# Patient Record
Sex: MALE | Race: WHITE | NOT HISPANIC OR LATINO | URBAN - METROPOLITAN AREA
[De-identification: names, ages, dates, MRNs, and addresses within clinical notes are randomized per-mention and may not be internally consistent; named-entity substitution may affect disease eponyms.]

---

## 2017-01-24 ENCOUNTER — COMMUNICATION - HEALTHEAST (OUTPATIENT)
Dept: CARDIOLOGY | Facility: CLINIC | Age: 82
End: 2017-01-24

## 2017-03-09 ENCOUNTER — AMBULATORY - HEALTHEAST (OUTPATIENT)
Dept: CARDIOLOGY | Facility: CLINIC | Age: 82
End: 2017-03-09

## 2017-03-09 ENCOUNTER — OFFICE VISIT - HEALTHEAST (OUTPATIENT)
Dept: CARDIOLOGY | Facility: CLINIC | Age: 82
End: 2017-03-09

## 2017-03-09 DIAGNOSIS — I48.0 PAROXYSMAL ATRIAL FIBRILLATION (H): ICD-10-CM

## 2017-03-09 DIAGNOSIS — Z95.810 ICD (IMPLANTABLE CARDIOVERTER-DEFIBRILLATOR), BIVENTRICULAR, IN SITU: ICD-10-CM

## 2017-03-09 DIAGNOSIS — I42.9 IDIOPATHIC CARDIOMYOPATHY (H): ICD-10-CM

## 2017-03-09 ASSESSMENT — MIFFLIN-ST. JEOR: SCORE: 1592.19

## 2017-06-12 ENCOUNTER — AMBULATORY - HEALTHEAST (OUTPATIENT)
Dept: CARDIOLOGY | Facility: CLINIC | Age: 82
End: 2017-06-12

## 2017-06-12 DIAGNOSIS — Z95.810 ICD (IMPLANTABLE CARDIOVERTER-DEFIBRILLATOR), BIVENTRICULAR, IN SITU: ICD-10-CM

## 2017-06-12 LAB — HCC DEVICE COMMENTS: NORMAL

## 2017-08-07 ENCOUNTER — COMMUNICATION - HEALTHEAST (OUTPATIENT)
Dept: CARDIOLOGY | Facility: CLINIC | Age: 82
End: 2017-08-07

## 2017-09-19 ENCOUNTER — AMBULATORY - HEALTHEAST (OUTPATIENT)
Dept: CARDIOLOGY | Facility: CLINIC | Age: 82
End: 2017-09-19

## 2017-09-19 ENCOUNTER — OFFICE VISIT - HEALTHEAST (OUTPATIENT)
Dept: CARDIOLOGY | Facility: CLINIC | Age: 82
End: 2017-09-19

## 2017-09-19 DIAGNOSIS — I25.10 CORONARY ARTERY DISEASE DUE TO CALCIFIED CORONARY LESION: ICD-10-CM

## 2017-09-19 DIAGNOSIS — I25.84 CORONARY ARTERY DISEASE DUE TO CALCIFIED CORONARY LESION: ICD-10-CM

## 2017-09-19 DIAGNOSIS — Z95.810 ICD (IMPLANTABLE CARDIOVERTER-DEFIBRILLATOR), BIVENTRICULAR, IN SITU: ICD-10-CM

## 2017-09-19 DIAGNOSIS — I42.8 OTHER PRIMARY CARDIOMYOPATHIES: ICD-10-CM

## 2017-09-19 LAB — HCC DEVICE COMMENTS: NORMAL

## 2017-09-19 RX ORDER — DORZOLAMIDE HCL 20 MG/ML
1 SOLUTION/ DROPS OPHTHALMIC 3 TIMES DAILY
Status: SHIPPED | COMMUNITY
Start: 2017-09-19

## 2017-09-19 ASSESSMENT — MIFFLIN-ST. JEOR: SCORE: 1516.9

## 2017-12-27 ENCOUNTER — AMBULATORY - HEALTHEAST (OUTPATIENT)
Dept: CARDIOLOGY | Facility: CLINIC | Age: 82
End: 2017-12-27

## 2017-12-27 DIAGNOSIS — Z95.810 ICD (IMPLANTABLE CARDIOVERTER-DEFIBRILLATOR), BIVENTRICULAR, IN SITU: ICD-10-CM

## 2017-12-27 LAB — HCC DEVICE COMMENTS: NORMAL

## 2018-02-27 ENCOUNTER — AMBULATORY - HEALTHEAST (OUTPATIENT)
Dept: CARDIOLOGY | Facility: CLINIC | Age: 83
End: 2018-02-27

## 2018-02-27 ENCOUNTER — RECORDS - HEALTHEAST (OUTPATIENT)
Dept: ADMINISTRATIVE | Facility: OTHER | Age: 83
End: 2018-02-27

## 2018-03-02 ENCOUNTER — OFFICE VISIT - HEALTHEAST (OUTPATIENT)
Dept: CARDIOLOGY | Facility: CLINIC | Age: 83
End: 2018-03-02

## 2018-03-02 ENCOUNTER — AMBULATORY - HEALTHEAST (OUTPATIENT)
Dept: CARDIOLOGY | Facility: CLINIC | Age: 83
End: 2018-03-02

## 2018-03-02 DIAGNOSIS — I48.21 PERMANENT ATRIAL FIBRILLATION (H): ICD-10-CM

## 2018-03-02 DIAGNOSIS — Z95.810 ICD (IMPLANTABLE CARDIOVERTER-DEFIBRILLATOR), BIVENTRICULAR, IN SITU: ICD-10-CM

## 2018-03-02 DIAGNOSIS — I42.0 DILATED CARDIOMYOPATHY (H): ICD-10-CM

## 2018-03-02 LAB — HCC DEVICE COMMENTS: NORMAL

## 2018-03-02 RX ORDER — PILOCARPINE HYDROCHLORIDE 20 MG/ML
1 SOLUTION/ DROPS OPHTHALMIC 2 TIMES DAILY
Status: SHIPPED | COMMUNITY
Start: 2018-03-02

## 2018-03-02 ASSESSMENT — MIFFLIN-ST. JEOR: SCORE: 1504.43

## 2018-05-28 ENCOUNTER — COMMUNICATION - HEALTHEAST (OUTPATIENT)
Dept: CARDIOLOGY | Facility: CLINIC | Age: 83
End: 2018-05-28

## 2018-05-28 DIAGNOSIS — I42.9 IDIOPATHIC CARDIOMYOPATHY (H): ICD-10-CM

## 2018-05-28 DIAGNOSIS — I48.0 PAROXYSMAL ATRIAL FIBRILLATION (H): ICD-10-CM

## 2018-05-31 ENCOUNTER — AMBULATORY - HEALTHEAST (OUTPATIENT)
Dept: CARDIOLOGY | Facility: CLINIC | Age: 83
End: 2018-05-31

## 2018-05-31 DIAGNOSIS — Z95.810 ICD (IMPLANTABLE CARDIOVERTER-DEFIBRILLATOR), BIVENTRICULAR, IN SITU: ICD-10-CM

## 2018-05-31 LAB
HCC DEVICE COMMENTS: NORMAL
HCC DEVICE IMPLANTING PROVIDER: NORMAL
HCC DEVICE MANUFACTURE: NORMAL
HCC DEVICE MODEL: NORMAL
HCC DEVICE SERIAL NUMBER: NORMAL
HCC DEVICE TYPE: NORMAL

## 2018-07-12 ENCOUNTER — COMMUNICATION - HEALTHEAST (OUTPATIENT)
Dept: ADMINISTRATIVE | Facility: CLINIC | Age: 83
End: 2018-07-12

## 2018-09-04 ENCOUNTER — RECORDS - HEALTHEAST (OUTPATIENT)
Dept: ADMINISTRATIVE | Facility: OTHER | Age: 83
End: 2018-09-04

## 2018-09-04 ENCOUNTER — AMBULATORY - HEALTHEAST (OUTPATIENT)
Dept: CARDIOLOGY | Facility: CLINIC | Age: 83
End: 2018-09-04

## 2018-09-06 ENCOUNTER — AMBULATORY - HEALTHEAST (OUTPATIENT)
Dept: CARDIOLOGY | Facility: CLINIC | Age: 83
End: 2018-09-06

## 2018-09-06 DIAGNOSIS — Z95.810 ICD (IMPLANTABLE CARDIOVERTER-DEFIBRILLATOR), BIVENTRICULAR, IN SITU: ICD-10-CM

## 2018-09-07 ENCOUNTER — OFFICE VISIT - HEALTHEAST (OUTPATIENT)
Dept: CARDIOLOGY | Facility: CLINIC | Age: 83
End: 2018-09-07

## 2018-09-07 DIAGNOSIS — I50.22 CHRONIC SYSTOLIC CONGESTIVE HEART FAILURE (H): ICD-10-CM

## 2018-09-07 RX ORDER — WARFARIN SODIUM 4 MG/1
TABLET ORAL
Refills: 3 | Status: SHIPPED | COMMUNITY
Start: 2018-07-11

## 2018-09-07 RX ORDER — TRAVOPROST OPHTHALMIC SOLUTION 0.04 MG/ML
1 SOLUTION OPHTHALMIC AT BEDTIME
Status: SHIPPED | COMMUNITY
Start: 2018-09-07

## 2018-09-07 ASSESSMENT — MIFFLIN-ST. JEOR: SCORE: 1490.82

## 2018-11-12 ENCOUNTER — COMMUNICATION - HEALTHEAST (OUTPATIENT)
Dept: CARDIOLOGY | Facility: CLINIC | Age: 83
End: 2018-11-12

## 2018-11-12 RX ORDER — METHAZOLAMIDE 50 MG/1
50 TABLET ORAL 2 TIMES DAILY
Status: SHIPPED | COMMUNITY
Start: 2018-11-12

## 2018-12-12 ENCOUNTER — AMBULATORY - HEALTHEAST (OUTPATIENT)
Dept: CARDIOLOGY | Facility: CLINIC | Age: 83
End: 2018-12-12

## 2018-12-12 DIAGNOSIS — Z95.810 ICD (IMPLANTABLE CARDIOVERTER-DEFIBRILLATOR), BIVENTRICULAR, IN SITU: ICD-10-CM

## 2019-01-04 ENCOUNTER — COMMUNICATION - HEALTHEAST (OUTPATIENT)
Dept: ADMINISTRATIVE | Facility: CLINIC | Age: 84
End: 2019-01-04

## 2019-02-25 ENCOUNTER — AMBULATORY - HEALTHEAST (OUTPATIENT)
Dept: CARDIOLOGY | Facility: CLINIC | Age: 84
End: 2019-02-25

## 2019-02-25 ENCOUNTER — RECORDS - HEALTHEAST (OUTPATIENT)
Dept: ADMINISTRATIVE | Facility: OTHER | Age: 84
End: 2019-02-25

## 2019-03-04 ENCOUNTER — DOCTOR'S OFFICE (OUTPATIENT)
Dept: URBAN - METROPOLITAN AREA CLINIC 125 | Facility: CLINIC | Age: 84
Setting detail: OPHTHALMOLOGY
End: 2019-03-04
Payer: COMMERCIAL

## 2019-03-04 DIAGNOSIS — H02.885: ICD-10-CM

## 2019-03-04 DIAGNOSIS — H00.15: ICD-10-CM

## 2019-03-04 DIAGNOSIS — H27.8: ICD-10-CM

## 2019-03-04 DIAGNOSIS — H35.3222: ICD-10-CM

## 2019-03-04 DIAGNOSIS — H35.3111: ICD-10-CM

## 2019-03-04 DIAGNOSIS — H43.813: ICD-10-CM

## 2019-03-04 DIAGNOSIS — H16.222: ICD-10-CM

## 2019-03-04 DIAGNOSIS — H01.004: ICD-10-CM

## 2019-03-04 DIAGNOSIS — H01.001: ICD-10-CM

## 2019-03-04 DIAGNOSIS — H16.221: ICD-10-CM

## 2019-03-04 DIAGNOSIS — H27.9: ICD-10-CM

## 2019-03-04 PROCEDURE — 92004 COMPRE OPH EXAM NEW PT 1/>: CPT | Performed by: OPHTHALMOLOGY

## 2019-03-04 PROCEDURE — 92134 CPTRZ OPH DX IMG PST SGM RTA: CPT | Performed by: OPHTHALMOLOGY

## 2019-03-04 ASSESSMENT — REFRACTION_MANIFEST
OS_VA1: 20/
OD_VA1: 20/
OD_VA3: 20/
OS_VA3: 20/
OD_VA2: 20/
OS_VA2: 20/
OU_VA: 20/
OS_VA2: 20/
OS_VA3: 20/
OD_VA3: 20/
OD_VA2: 20/
OU_VA: 20/
OD_VA1: 20/
OS_VA1: 20/

## 2019-03-04 ASSESSMENT — REFRACTION_AUTOREFRACTION
OS_SPHERE: UNABLE
OD_SPHERE: -0.25
OD_AXIS: 60
OD_CYLINDER: -1.25

## 2019-03-04 ASSESSMENT — LID EXAM ASSESSMENTS
OD_BLEPHARITIS: 1+
OD_MEIBOMITIS: 1+
OS_MEIBOMITIS: 1+
OS_BLEPHARITIS: 1+

## 2019-03-04 ASSESSMENT — REFRACTION_CURRENTRX
OS_OVR_VA: 20/
OD_OVR_VA: 20/
OS_OVR_VA: 20/
OS_OVR_VA: 20/
OD_OVR_VA: 20/
OD_OVR_VA: 20/

## 2019-03-04 ASSESSMENT — SUPERFICIAL PUNCTATE KERATITIS (SPK)
OD_SPK: 1+
OS_SPK: 2+

## 2019-03-04 ASSESSMENT — VISUAL ACUITY
OS_BCVA: 20/40
OD_BCVA: 20/400

## 2019-03-04 ASSESSMENT — CONFRONTATIONAL VISUAL FIELD TEST (CVF)
OD_FINDINGS: FULL
OS_FINDINGS: FULL

## 2019-03-04 ASSESSMENT — SPHEQUIV_DERIVED: OD_SPHEQUIV: -0.875

## 2019-03-20 ENCOUNTER — DOCTOR'S OFFICE (OUTPATIENT)
Dept: URBAN - METROPOLITAN AREA CLINIC 125 | Facility: CLINIC | Age: 84
Setting detail: OPHTHALMOLOGY
End: 2019-03-20
Payer: COMMERCIAL

## 2019-03-20 ENCOUNTER — RX ONLY (RX ONLY)
Age: 84
End: 2019-03-20

## 2019-03-20 DIAGNOSIS — H35.3114: ICD-10-CM

## 2019-03-20 DIAGNOSIS — H43.813: ICD-10-CM

## 2019-03-20 DIAGNOSIS — H35.3222: ICD-10-CM

## 2019-03-20 PROBLEM — H00.15 CHALAZION; LEFT LOWER LID: Status: ACTIVE | Noted: 2019-03-04

## 2019-03-20 PROBLEM — H01.001 BLEPHARITIS; RIGHT UPPER LID, LEFT UPPER LID: Status: ACTIVE | Noted: 2019-03-04

## 2019-03-20 PROBLEM — H27.8 PSEUDOPHAKIA: Status: ACTIVE | Noted: 2019-03-04

## 2019-03-20 PROBLEM — H01.004 BLEPHARITIS; RIGHT UPPER LID, LEFT UPPER LID: Status: ACTIVE | Noted: 2019-03-04

## 2019-03-20 PROBLEM — H02.885 MEIBOMIAN GLAND DYSFUNCTION LEFT LOWER EYELID: Status: ACTIVE | Noted: 2019-03-04

## 2019-03-20 PROBLEM — H16.222 KERATOCONJUNCTIVITIS SICCA; RIGHT EYE, LEFT EYE: Status: ACTIVE | Noted: 2019-03-04

## 2019-03-20 PROBLEM — H27.9 PSEUDOPHAKIA: Status: ACTIVE | Noted: 2019-03-04

## 2019-03-20 PROBLEM — H16.221 KERATOCONJUNCTIVITIS SICCA; RIGHT EYE, LEFT EYE: Status: ACTIVE | Noted: 2019-03-04

## 2019-03-20 PROCEDURE — 92014 COMPRE OPH EXAM EST PT 1/>: CPT | Performed by: OPHTHALMOLOGY

## 2019-03-20 ASSESSMENT — SPHEQUIV_DERIVED: OD_SPHEQUIV: -0.875

## 2019-03-20 ASSESSMENT — LID EXAM ASSESSMENTS
OS_BLEPHARITIS: 1+
OS_MEIBOMITIS: 1+
OD_MEIBOMITIS: 1+
OD_BLEPHARITIS: 1+

## 2019-03-20 ASSESSMENT — REFRACTION_MANIFEST
OS_VA1: 20/
OD_VA2: 20/
OS_VA2: 20/
OS_VA1: 20/
OS_VA3: 20/
OD_VA2: 20/
OS_VA2: 20/
OD_VA3: 20/
OU_VA: 20/
OU_VA: 20/
OD_VA1: 20/
OD_VA3: 20/
OD_VA1: 20/
OS_VA3: 20/

## 2019-03-20 ASSESSMENT — SUPERFICIAL PUNCTATE KERATITIS (SPK)
OD_SPK: 1+
OS_SPK: 2+

## 2019-03-20 ASSESSMENT — REFRACTION_CURRENTRX
OS_OVR_VA: 20/
OD_OVR_VA: 20/
OS_OVR_VA: 20/
OD_OVR_VA: 20/
OS_OVR_VA: 20/
OD_OVR_VA: 20/

## 2019-03-20 ASSESSMENT — REFRACTION_AUTOREFRACTION
OS_SPHERE: UNABLE
OD_CYLINDER: -1.25
OD_SPHERE: -0.25
OD_AXIS: 60

## 2019-03-20 ASSESSMENT — VISUAL ACUITY
OD_BCVA: 20/400
OS_BCVA: 20/40

## 2019-03-20 ASSESSMENT — CONFRONTATIONAL VISUAL FIELD TEST (CVF)
OS_FINDINGS: FULL
OD_FINDINGS: FULL

## 2019-04-01 ENCOUNTER — COMMUNICATION - HEALTHEAST (OUTPATIENT)
Dept: CARDIOLOGY | Facility: CLINIC | Age: 84
End: 2019-04-01

## 2019-04-01 DIAGNOSIS — I42.9 IDIOPATHIC CARDIOMYOPATHY (H): ICD-10-CM

## 2019-04-01 DIAGNOSIS — I48.0 PAROXYSMAL ATRIAL FIBRILLATION (H): ICD-10-CM

## 2019-06-03 ENCOUNTER — AMBULATORY - HEALTHEAST (OUTPATIENT)
Dept: CARDIOLOGY | Facility: CLINIC | Age: 84
End: 2019-06-03

## 2019-06-03 DIAGNOSIS — Z95.810 ICD (IMPLANTABLE CARDIOVERTER-DEFIBRILLATOR), BIVENTRICULAR, IN SITU: ICD-10-CM

## 2019-06-06 ENCOUNTER — COMMUNICATION - HEALTHEAST (OUTPATIENT)
Dept: CARDIOLOGY | Facility: CLINIC | Age: 84
End: 2019-06-06

## 2019-06-06 DIAGNOSIS — I48.0 PAROXYSMAL ATRIAL FIBRILLATION (H): ICD-10-CM

## 2019-06-06 DIAGNOSIS — I42.9 IDIOPATHIC CARDIOMYOPATHY (H): ICD-10-CM

## 2019-06-06 RX ORDER — DIGOXIN 125 MCG
TABLET ORAL
Qty: 45 TABLET | Refills: 1 | Status: SHIPPED | OUTPATIENT
Start: 2019-06-06

## 2019-06-25 ENCOUNTER — COMMUNICATION - HEALTHEAST (OUTPATIENT)
Dept: CARDIOLOGY | Facility: CLINIC | Age: 84
End: 2019-06-25

## 2019-08-12 ENCOUNTER — OFFICE VISIT - HEALTHEAST (OUTPATIENT)
Dept: CARDIOLOGY | Facility: CLINIC | Age: 84
End: 2019-08-12

## 2019-08-12 ENCOUNTER — AMBULATORY - HEALTHEAST (OUTPATIENT)
Dept: CARDIOLOGY | Facility: CLINIC | Age: 84
End: 2019-08-12

## 2019-08-12 DIAGNOSIS — Z95.810 ICD (IMPLANTABLE CARDIOVERTER-DEFIBRILLATOR), BIVENTRICULAR, IN SITU: ICD-10-CM

## 2019-08-12 DIAGNOSIS — I48.21 PERMANENT ATRIAL FIBRILLATION (H): ICD-10-CM

## 2019-08-12 DIAGNOSIS — I50.22 CHRONIC SYSTOLIC CONGESTIVE HEART FAILURE (H): ICD-10-CM

## 2019-08-12 RX ORDER — ACETAMINOPHEN 500 MG
1000 TABLET ORAL PRN
Status: SHIPPED | COMMUNITY
Start: 2019-08-12

## 2019-08-12 ASSESSMENT — MIFFLIN-ST. JEOR: SCORE: 1341.14

## 2019-11-07 ENCOUNTER — COMMUNICATION - HEALTHEAST (OUTPATIENT)
Dept: CARDIOLOGY | Facility: CLINIC | Age: 84
End: 2019-11-07

## 2019-11-20 ENCOUNTER — AMBULATORY - HEALTHEAST (OUTPATIENT)
Dept: CARDIOLOGY | Facility: CLINIC | Age: 84
End: 2019-11-20

## 2019-11-20 DIAGNOSIS — I42.9 IDIOPATHIC CARDIOMYOPATHY (H): ICD-10-CM

## 2019-11-20 DIAGNOSIS — Z95.810 ICD (IMPLANTABLE CARDIOVERTER-DEFIBRILLATOR), BIVENTRICULAR, IN SITU: ICD-10-CM

## 2019-11-22 ENCOUNTER — RECORDS - HEALTHEAST (OUTPATIENT)
Dept: ADMINISTRATIVE | Facility: OTHER | Age: 84
End: 2019-11-22

## 2021-05-30 VITALS — BODY MASS INDEX: 30.58 KG/M2 | HEIGHT: 70 IN | WEIGHT: 213.6 LBS

## 2021-05-30 NOTE — TELEPHONE ENCOUNTER
----- Message from Arleth Jeronimo sent at 6/25/2019  1:49 PM CDT -----  Regarding: Pt needs to change pharmacy on file with us  Contact: [phone]  Nupur,     This pt came in to say that [facility] needs [facility] because he is unable to drive to  his meds, to [facility] through Dr. Lawrence.  Said that whenever anything is prescribed for him it should be faxed to [phone] so that they can get it to him.  He has two co-care coordinators Azra Sweeney LPN, and Dia Ortiz LPN whose phone number is [phone] if there are any additional questions.      Arleth ERICKSON [id number]          Noted. [facility] [place]

## 2021-05-31 ENCOUNTER — RECORDS - HEALTHEAST (OUTPATIENT)
Dept: ADMINISTRATIVE | Facility: CLINIC | Age: 86
End: 2021-05-31

## 2021-05-31 VITALS — BODY MASS INDEX: 28.2 KG/M2 | WEIGHT: 197 LBS | HEIGHT: 70 IN

## 2021-05-31 NOTE — PROGRESS NOTES
In clinic device check with Device RN followed by office visit with Dr. Casey.  Please see link for full device report.  Patient was informed of results and next follow up during today's visit.

## 2021-05-31 NOTE — PATIENT INSTRUCTIONS - HE
Kristopher Babcock,    It was a pleasure to see you today at the [facility].     My recommendations after this visit include:    Leg elevation    ABIODUN Casey MD, FACC, MARY

## 2021-05-31 NOTE — PROGRESS NOTES
"Cardiology Progress Note    Assessment:  Nonischemic dilated cardiomyopathy, moderately depressed LV systolic function LVEF 35%, well compensated functional class II , euvolemic  Permanent atrial fibrillation, rate controlled, on warfarin    Nonobstructive coronary artery disease    Biventricular pacemaker/AICD, normal function  Chronic lower extremities edema due to venous insufficiency  Weight loss likely due to poor oral food intake    Plan:  I do not believe the leg edema is cardiac in origin.  Lack of pulmonary congestion and elevated JVD argues for normal filling pressures.    I recommended leg elevation.  I understand that he was scheduled by his primary physician to undergo ultrasound of lower extremities to rule out DVT    Follow-up in 1 year    Subjective:   This is 89 y.o. male who comes in today for follow-up visit.  He reports no new cardiac symptoms.  He has had persistent lower extremity edema.  He does not seem to be bothered by that.  He denies orthopnea or PND.  He is compliant with warfarin treatment.    Review of Systems:   General: WNL  Eyes: WNL  Ears/Nose/Throat: WNL  Lungs: WNL  Heart: WNL  Stomach: WNL  Bladder: WNL  Muscle/Joints: WNL  Skin: WNL  Nervous System: WNL  Mental Health: WNL     Blood: WNL    Objective:   /52 (Patient Site: Left Arm, Patient Position: Sitting, Cuff Size: Adult Regular)   Pulse 71   Resp 18   Ht 5' 9\" (1.753 m)   Wt 160 lb (72.6 kg) Comment: With shoes  BMI 23.63 kg/m    Physical Exam:  GENERAL: no distress  NECK: No JVD  LUNGS: Clear to auscultation.  CARDIAC: regular rhythm, S1 & S2 normal.  No heaves, thrills, gallops or murmurs.  ABDOMEN: flat, negative hepatosplenomegaly, soft and non-tender.  EXTREMITIES: No evidence of cyanosis, clubbing 3+ edema    Current Outpatient Medications   Medication Sig Dispense Refill     acetaminophen (TYLENOL) 500 MG tablet Take 1,000 mg by mouth as needed.       cholecalciferol, vitamin D3, 1,000 unit tablet Take " 1,000 Units by mouth every evening.       digoxin (LANOXIN) 125 mcg tablet TAKE 1 TABLET BY MOUTH THREE TIMES A WEEK ON MONDAY, WEDNESDAY, AND FRIDAY. 45 tablet 1     docusate sodium (COLACE) 100 MG capsule Take 200 mg by mouth every evening.       dorzolamide (TRUSOPT) 2 % ophthalmic solution 1 drop 3 (three) times a day.       latanoprost (XALATAN) 0.005 % ophthalmic solution Administer 1 drop to both eyes bedtime.        methazolAMIDE (NEPTAZANE) 50 MG tablet Take 50 mg by mouth 2 (two) times a day.       metoprolol succinate (TOPROL XL) 50 MG 24 hr tablet Take 1 tablet (50 mg total) by mouth bedtime. 30 tablet 3     pilocarpine (PILOCAR) 2 % ophthalmic solution Administer 1 drop into the left eye 2 (two) times a day.       POLYETHYLENE GLYCOL 3350 (MIRALAX ORAL) Take by mouth.       warfarin (COUMADIN) 4 MG tablet Take as directed  3     warfarin (COUMADIN) 5 MG tablet Take 4 mg by mouth daily.        brimonidine (ALPHAGAN) 0.2 % ophthalmic solution Administer 1 drop to both eyes 2 (two) times a day.  0     dorzolamide-timolol (COSOPT) 22.3-6.8 mg/mL ophthalmic solution Administer 1 drop to both eyes 2 (two) times a day.       multivitamin with minerals (THERA-M) 9 mg iron-400 mcg Tab tablet Take 1 tablet by mouth every evening.       travoprost (TRAVATAN Z) 0.004 % ophthalmic drops Administer 1 drop to both eyes at bedtime.       No current facility-administered medications for this visit.        Cardiographics:    Pacemaker interrogation: Permanent atrial fibrillation, 96% biventricular paced    Echocardiogram: April 2015   Severe hypokinesis of the inferior segment(s) of the left ventricle. .    Overall left ventricular systolic function is moderately to severely    depressed.    Left ventricular ejection fraction is visually estimated to be 35 %.    Mild tricuspid regurgitation.    Doppler findings do not suggest pulmonary hypertension.      Coronary angiogram: 2013   Mild less than 40% nonobstructive  coronary artery disease      Lab Results:       Lab Results   Component Value Date    CHOL 165 02/11/2013     Lab Results   Component Value Date    HDL 43 02/11/2013     Lab Results   Component Value Date    LDLCALC 102 02/11/2013     Lab Results   Component Value Date    TRIG 103 02/11/2013     BNP   Date Value Ref Range Status   01/21/2016 241 (H) 0 - 93 pg/mL Final       Ferdinand (Ronny)  MD Jacinto

## 2021-06-01 ENCOUNTER — RECORDS - HEALTHEAST (OUTPATIENT)
Dept: ADMINISTRATIVE | Facility: CLINIC | Age: 86
End: 2021-06-01

## 2021-06-01 VITALS — BODY MASS INDEX: 29.03 KG/M2 | WEIGHT: 196 LBS | HEIGHT: 69 IN

## 2021-06-02 VITALS — WEIGHT: 193 LBS | HEIGHT: 69 IN | BODY MASS INDEX: 28.58 KG/M2

## 2021-06-03 ENCOUNTER — RECORDS - HEALTHEAST (OUTPATIENT)
Dept: ADMINISTRATIVE | Facility: CLINIC | Age: 86
End: 2021-06-03

## 2021-06-03 VITALS — BODY MASS INDEX: 23.7 KG/M2 | HEIGHT: 69 IN | WEIGHT: 160 LBS

## 2021-06-03 NOTE — TELEPHONE ENCOUNTER
VM left by patient wondering if Kylah had received the information she needed. From what I can tell we have not received a call back from hospice. Will have Kylah review and contact patient tomorrow AM.   #[phone]

## 2021-06-03 NOTE — TELEPHONE ENCOUNTER
----- Message from Marlys Senior RDCS sent at 11/7/2019 12:59 PM CST -----  Regarding: device RN review  Patient was due for remote yesterday, when I called him he said he is now on home hospice and he thinks they might have unplugged the monitor. He has an ICD and is dependent. He asked that we call the hospice staff to see what they say about it. His RN is Lisa at [facility], [phone]. Detection/therapies are still programmed on.    -------------------------------------------------------------------------------    Above noted. Call placed to [facility], Left Message for Lisa WALLACE to call back to obtain orders to DC device follow up and also to discuss deactivating ICD therapies.     Kylah Wells, RN

## 2021-06-03 NOTE — TELEPHONE ENCOUNTER
Call placed to patient's son phone # but number is not working. Call placed to patient to review plan. He sounds like he is still wanting to keep the defibrillation on right now. Wants to talk hospice RN, about plan. Patient also has daughter there. Daughter states hospice staff told them the home monitor was not needed anymore and unplugged it. Reviewed with them that we would like orders about this, and also to discuss defibrillator still being active. Patient states he is calling Hospice today and will try to sort this all out. He has our device clinic RN line. If cannot Hospice will ask patient to plug monitor back in until official plan is established.    Will await call from Hospice.   Kylah Wells, RN

## 2021-06-03 NOTE — TELEPHONE ENCOUNTER
Lisa WALLACE called back, will fax over orders to DC device follow up and DC ICD therapies to our Device RN Fax. Until orders can be obtained they are plugging monitor back in.    Kylah Wells RN

## 2021-06-03 NOTE — TELEPHONE ENCOUNTER
Spoke with Lisa WALLACE today about Device follow up concerns. Hospice RN states they are having a hard time discussing this with patient because he is confused with how the process works and cannot make up his mind. Gets very anxious about monitor being plugged in, feels like he is constantly having to worry about it according to Lisa, so they unplugged it. Discussed that we just would like an order faxed over to discontinue device follow up.       Also reviewed with her that patient's defibrillator is still active. She states he is aware of this and is having a hard time with turning if off. Will discuss with family.    Kylah Wells RN

## 2021-06-03 NOTE — TELEPHONE ENCOUNTER
Attempted to reach nurse to review device folllow up /ICD therapies with Hospice. Was transferred to Lisa WALLCAE's , left detailed message asking for return call to discuss.  No return calls as of yet.  Kylah Wells RN

## 2021-06-09 NOTE — PROGRESS NOTES
"Cardiology Progress Note    Assessment:  Nonischemic dilated cardiomyopathy, moderately depressed LV systolic function LVEF 35%, well compensated functional class II    Hypotension, improved after switching from carvedilol to metoprolol and discontinuation of spironolactone  Chronic atrial fibrillation, rate controlled, on warfarin    Nonobstructive coronary artery disease    Biventricular pacemaker/AICD, normal function      Plan:  He does not appear to have any vascular congestion at this point.  He can do about diuretics.  He has chronic lower extremity edema which is related to venous insufficiency.  Follow-up in 6 months    Subjective:   This is 89 y.o. male who comes in today for follow-up visit.  He denies increasing chest pain or shortness of breath.  His weight has been stable.  He does not have PND orthopnea.  He had more problems with feeling lightheaded.  Blood pressure was low.  I decided to discontinue Spironolactone.  He denies any recent episodes of lightheadedness since medication changes.    Review of Systems:   General: WNL  Eyes: WNL  Ears/Nose/Throat: WNL  Lungs: WNL  Heart: WNL  Stomach: WNL  Bladder: WNL  Muscle/Joints: WNL  Skin: WNL  Nervous System: WNL  Mental Health: WNL     Blood: WNL    Objective:   Visit Vitals     /78 (Patient Site: Right Arm, Patient Position: Sitting, Cuff Size: Adult Regular)     Pulse 72     Resp 16     Ht 5' 9.5\" (1.765 m)     Wt 213 lb 9.6 oz (96.9 kg)     BMI 31.09 kg/m2     Physical Exam:  GENERAL: no distress  NECK: No JVD  LUNGS: Clear to auscultation.  CARDIAC: regular rhythm, S1 & S2 normal.  No heaves, thrills, gallops or murmurs.  ABDOMEN: flat, negative hepatosplenomegaly, soft and non-tender.  EXTREMITIES: No evidence of cyanosis, clubbing 2+ edema    Current Outpatient Prescriptions   Medication Sig Dispense Refill     brimonidine (ALPHAGAN) 0.2 % ophthalmic solution Administer 1 drop to both eyes 2 (two) times a day.  0     cholecalciferol, " vitamin D3, 1,000 unit tablet Take 1,000 Units by mouth every evening.       digoxin (DIGOX) 125 mcg tablet Three times a week, M, W, F 45 tablet 12     docusate sodium (COLACE) 100 MG capsule Take 200 mg by mouth every evening.       dorzolamide-timolol (COSOPT) 22.3-6.8 mg/mL ophthalmic solution Administer 1 drop to both eyes 2 (two) times a day.       latanoprost (XALATAN) 0.005 % ophthalmic solution Administer 1 drop to both eyes bedtime.        lisinopril (PRINIVIL,ZESTRIL) 2.5 MG tablet TAKE 1 TABLET BY MOUTH EVERY EVENING 90 tablet 2     metoprolol succinate (TOPROL XL) 50 MG 24 hr tablet Take 1 tablet (50 mg total) by mouth bedtime. 30 tablet 3     multivitamin with minerals (THERA-M) 9 mg iron-400 mcg Tab tablet Take 1 tablet by mouth every evening.       warfarin (COUMADIN) 5 MG tablet Take 4 mg by mouth daily.        No current facility-administered medications for this visit.        Cardiographics:    Echocardiogram: April 2015 Severe hypokinesis of the inferior segment(s) of the left ventricle. .    Overall left ventricular systolic function is moderately to severely    depressed.    Left ventricular ejection fraction is visually estimated to be 35 %.    Mild tricuspid regurgitation.    Doppler findings do not suggest pulmonary hypertension.      Coronary angiogram in 2013: Mild less than 40% nonobstructive coronary artery disease    Lab Results:       Lab Results   Component Value Date    CHOL 165 02/11/2013     Lab Results   Component Value Date    HDL 43 02/11/2013     Lab Results   Component Value Date    LDLCALC 102 02/11/2013     Lab Results   Component Value Date    TRIG 103 02/11/2013     No components found for: CHOLHDL  BNP   Date Value Ref Range Status   01/21/2016 241 (H) 0 - 93 pg/mL Final       Ferdinand (Ronyn)  MD Jacinto

## 2021-06-13 NOTE — PROGRESS NOTES
"Cardiology Progress Note    Assessment:  Nonischemic dilated cardiomyopathy, moderately depressed LV systolic function LVEF 35%, well compensated functional class II    Chronic atrial fibrillation, rate controlled, on warfarin    Nonobstructive coronary artery disease    Biventricular pacemaker/AICD, normal function      Plan:  He has done well since discontinuation of lisinopril and spironolactone.  He feels more steady on his feet.  He has not had hypotension again.  We will continue digoxin and metoprolol for cardiomyopathy.  He does not require any diuretics at this point.   I felt that we may want to reassess LV systolic function with echo.  He was not too anxious to have an additional testing at this point.   Follow-up in 6 months    Subjective:   This is 89 y.o. male who comes in today for follow-up visit.  His main concern is the pain in hip and recurrent bladder infections.  He has not had any chest pain or increasing shortness of breath.  He feels more steady on his feet after CHF medications adjustments.    Review of Systems:   General: WNL  Eyes: WNL  Ears/Nose/Throat: WNL  Lungs: WNL  Heart: WNL  Stomach: WNL  Bladder: WNL  Muscle/Joints: WNL  Skin: WNL  Nervous System: WNL  Mental Health: WNL     Blood: WNL    Objective:   /74 (Patient Site: Right Arm, Patient Position: Sitting, Cuff Size: Adult Regular)  Pulse 80  Resp 16  Ht 5' 9.5\" (1.765 m)  Wt 197 lb (89.4 kg)  SpO2 98%  BMI 28.67 kg/m2  Physical Exam:  GENERAL: no distress  NECK: No JVD  LUNGS: Clear to auscultation.  CARDIAC: regular rhythm, S1 & S2 normal.  No heaves, thrills, gallops or murmurs.  ABDOMEN: flat, negative hepatosplenomegaly, soft and non-tender.  EXTREMITIES: No evidence of cyanosis, clubbing or edema.    Current Outpatient Prescriptions   Medication Sig Dispense Refill     brimonidine (ALPHAGAN) 0.2 % ophthalmic solution Administer 1 drop to both eyes 2 (two) times a day.  0     cholecalciferol, vitamin D3, 1,000 " unit tablet Take 1,000 Units by mouth every evening.       digoxin (DIGOX) 125 mcg tablet Three times a week, M, W, F 45 tablet 12     docusate sodium (COLACE) 100 MG capsule Take 200 mg by mouth every evening.       dorzolamide (TRUSOPT) 2 % ophthalmic solution 1 drop 3 (three) times a day.       dorzolamide-timolol (COSOPT) 22.3-6.8 mg/mL ophthalmic solution Administer 1 drop to both eyes 2 (two) times a day.       latanoprost (XALATAN) 0.005 % ophthalmic solution Administer 1 drop to both eyes bedtime.        metoprolol succinate (TOPROL XL) 50 MG 24 hr tablet Take 1 tablet (50 mg total) by mouth bedtime. 30 tablet 3     multivitamin with minerals (THERA-M) 9 mg iron-400 mcg Tab tablet Take 1 tablet by mouth every evening.       POLYETHYLENE GLYCOL 3350 (MIRALAX ORAL) Take by mouth.       warfarin (COUMADIN) 5 MG tablet Take 4 mg by mouth daily.        No current facility-administered medications for this visit.        Cardiographics:    Echocardiogram: April 2015 Severe hypokinesis of the inferior segment(s) of the left ventricle. .    Overall left ventricular systolic function is moderately to severely    depressed.    Left ventricular ejection fraction is visually estimated to be 35 %.    Mild tricuspid regurgitation.    Doppler findings do not suggest pulmonary hypertension.      Coronary angiogram in 2013: Mild less than 40% nonobstructive coronary artery disease    Lab Results:       Lab Results   Component Value Date    CHOL 165 02/11/2013     Lab Results   Component Value Date    HDL 43 02/11/2013     Lab Results   Component Value Date    LDLCALC 102 02/11/2013     Lab Results   Component Value Date    TRIG 103 02/11/2013     No components found for: CHOLHDL  BNP   Date Value Ref Range Status   01/21/2016 241 (H) 0 - 93 pg/mL Final       Ferdinand (Ronny)  MD Jacinto

## 2021-06-15 PROBLEM — Z95.810 ICD (IMPLANTABLE CARDIOVERTER-DEFIBRILLATOR), BIVENTRICULAR, IN SITU: Status: ACTIVE | Noted: 2017-03-09

## 2021-06-16 PROBLEM — I50.22 CHRONIC SYSTOLIC CONGESTIVE HEART FAILURE (H): Status: ACTIVE | Noted: 2018-09-07

## 2021-06-16 NOTE — PROGRESS NOTES
"Cardiology Progress Note    Assessment:  Nonischemic dilated cardiomyopathy, moderately depressed LV systolic function LVEF 35%, well compensated functional class II    Chronic atrial fibrillation, rate controlled, on warfarin    Nonobstructive coronary artery disease    Biventricular pacemaker/AICD, normal function      Plan:  As long as he does not have increasing shortness of breath I would not restart diuretics.  He was very unsteady and fatigued when he was taking Spironolactone.  Follow-up in 6 months    Subjective:   This is 89 y.o. male who comes in today for follow-up visit.  He reports no shortness of breath or chest pains.  He did notice mild swelling of feet.  Swelling improves after overnight rest.    Review of Systems:   General: WNL  Eyes: WNL  Ears/Nose/Throat: WNL  Lungs: WNL  Heart: WNL  Stomach: WNL  Bladder: WNL  Muscle/Joints: WNL  Skin: WNL  Nervous System: WNL  Mental Health: WNL     Blood: WNL    Objective:   /78 (Patient Site: Right Arm, Patient Position: Sitting, Cuff Size: Adult Regular)  Pulse 88  Resp 16  Ht 5' 9\" (1.753 m)  Wt 196 lb (88.9 kg)  BMI 28.94 kg/m2  Physical Exam:  GENERAL: no distress  NECK: No JVD  LUNGS: Clear to auscultation.  CARDIAC: irregular rhythm, S1 & S2 normal.  No heaves, thrills, gallops or murmurs.  ABDOMEN: flat, negative hepatosplenomegaly, soft and non-tender.  EXTREMITIES: No evidence of cyanosis, clubbing or edema.    Current Outpatient Prescriptions   Medication Sig Dispense Refill     cholecalciferol, vitamin D3, 1,000 unit tablet Take 1,000 Units by mouth every evening.       digoxin (DIGOX) 125 mcg tablet Three times a week, M, W, F 45 tablet 12     docusate sodium (COLACE) 100 MG capsule Take 200 mg by mouth every evening.       dorzolamide (TRUSOPT) 2 % ophthalmic solution 1 drop 3 (three) times a day.       dorzolamide-timolol (COSOPT) 22.3-6.8 mg/mL ophthalmic solution Administer 1 drop to both eyes 2 (two) times a day.       " latanoprost (XALATAN) 0.005 % ophthalmic solution Administer 1 drop to both eyes bedtime.        metoprolol succinate (TOPROL XL) 50 MG 24 hr tablet Take 1 tablet (50 mg total) by mouth bedtime. 30 tablet 3     multivitamin with minerals (THERA-M) 9 mg iron-400 mcg Tab tablet Take 1 tablet by mouth every evening.       pilocarpine (PILOCAR) 2 % ophthalmic solution Administer 1 drop into the left eye 2 (two) times a day.       warfarin (COUMADIN) 5 MG tablet Take 4 mg by mouth daily.        brimonidine (ALPHAGAN) 0.2 % ophthalmic solution Administer 1 drop to both eyes 2 (two) times a day.  0     POLYETHYLENE GLYCOL 3350 (MIRALAX ORAL) Take by mouth.       No current facility-administered medications for this visit.        Cardiographics:    Device interrogation: Permanent A. fib 98% biventricular paced    Echocardiogram: April 2015   Severe hypokinesis of the inferior segment(s) of the left ventricle. .    Overall left ventricular systolic function is moderately to severely    depressed.    Left ventricular ejection fraction is visually estimated to be 35 %.    Mild tricuspid regurgitation.    Doppler findings do not suggest pulmonary hypertension.      Coronary angiogram: 2013   Mild less than 40% nonobstructive coronary artery disease  Lab Results:       Lab Results   Component Value Date    CHOL 165 02/11/2013     Lab Results   Component Value Date    HDL 43 02/11/2013     Lab Results   Component Value Date    LDLCALC 102 02/11/2013     Lab Results   Component Value Date    TRIG 103 02/11/2013     No components found for: CHOLHDL  BNP   Date Value Ref Range Status   01/21/2016 241 (H) 0 - 93 pg/mL Final       Ferdinand (Hernandez Casey MD

## 2021-06-16 NOTE — PROGRESS NOTES
In clinic device check with Device Nurse followed by office visit wt Dr. Casey.  Please see link for full device report.  Patient was informed of results and next follow up during today's visit.

## 2021-06-18 NOTE — LETTER
Letter by Ferdinand Casey MD (Ted) at      Author: Ferdinand Casey MD (Ted) Service: -- Author Type: --    Filed:  Encounter Date: 1/4/2019 Status: (Other)       Kristopher Babcock  [address] [facility] [address] [facility] [address]      January 4, 2019      Dear Kristopher,    This letter is to remind you that you will be due for your follow up appointment with Dr. oRnny Casey in March, 2019. To help ensure you are in the best health possible, a regular follow-up with your cardiologist is essential.     Please call our Patient Scheduling Line at [phone] to schedule your appointment at your earliest convenience.  If you have recently scheduled an appointment, please disregard this letter.    We look forward to seeing you again. As always, we are available at the number  above for any questions or concerns you may have.      Sincerely,   The Physicians and Staff of [facility]

## 2021-06-19 NOTE — LETTER
Letter by Palak Aparicio at      Author: Palak Aparicio Service: -- Author Type: --    Filed:  Encounter Date: 6/3/2019 Status: (Other)         Kristopher WALLS Sadiq  [address] [facility] [address] [address]      Yara 3, 2019      Dear Mr. Babcock,    RE: Remote Results    We are writing to you regarding your recent Remote ICD check from home. Your transmission was received successfully. Battery status is satisfactory at this time.     Your results are within normal limits.    Your next device appointment will be a remote check on September 4, 2019; this will occur automatically.    To schedule or reschedule, please call [phone] and press 1.    NOTE: If you would like to do an extra transmission, please call [phone] and press 3 to speak to a nurse BEFORE transmitting. This ensures that the Device Clinic staff is aware of the reason you are sending a transmission, and can follow-up with you after it has been reviewed.    We will be checking your implanted device from home (remotely) every three months unless otherwise instructed. We will need to see you in the clinic at least once a year. You may need to be seen in the clinic sooner depending on the results of your check.    Please be aware:    The follow-up schedule is like a Physician prescription.    Your remote monitor is paired to your specific implanted device.      Sincerely,    [facility]

## 2021-06-20 NOTE — PROGRESS NOTES
"Cardiology Progress Note    Assessment:  Nonischemic dilated cardiomyopathy, moderately depressed LV systolic function LVEF 35%, well compensated functional class II , euvolemic  Permanent atrial fibrillation, rate controlled, on warfarin    Nonobstructive coronary artery disease    Biventricular pacemaker/AICD, normal function  Lower extremity edema due to venous insufficiency    Plan:  He appears to be well compensated from a cardiac standpoint.  No medication changes today.  Follow-up in 6 months    Subjective:   This is 89 y.o. male who comes in today for follow-up visit.  He has done well.  He denies increasing shortness of breath.  His weight has been stable.  He continues to have mild lower extremity edema.  He is compliant with cardiac medications  Review of Systems:   General: WNL  Eyes: WNL  Ears/Nose/Throat: Hearing Loss  Lungs: WNL  Heart: WNL  Stomach: WNL  Bladder: WNL  Muscle/Joints: WNL  Skin: WNL  Nervous System: WNL  Mental Health: WNL     Blood: WNL    Objective:   /78 (Patient Site: Right Arm, Patient Position: Sitting, Cuff Size: Adult Regular)  Pulse 68  Resp 16  Ht 5' 9\" (1.753 m)  Wt 193 lb (87.5 kg)  BMI 28.5 kg/m2  Physical Exam:  GENERAL: no distress  NECK: No JVD  LUNGS: Clear to auscultation.  CARDIAC: regular rhythm, S1 & S2 normal.  No heaves, thrills, gallops or murmurs.  ABDOMEN: flat, negative hepatosplenomegaly, soft and non-tender.  EXTREMITIES: No evidence of cyanosis, clubbing, 1+ edema.    Current Outpatient Prescriptions   Medication Sig Dispense Refill     cholecalciferol, vitamin D3, 1,000 unit tablet Take 1,000 Units by mouth every evening.       DIGOX 125 mcg tablet TAKE 1 TABLET BY MOUTH THREE TIMES A WEEK ON MONDAY, WEDNESDAY, AND FRIDAY. 45 tablet 2     docusate sodium (COLACE) 100 MG capsule Take 200 mg by mouth every evening.       dorzolamide (TRUSOPT) 2 % ophthalmic solution 1 drop 3 (three) times a day.       metoprolol succinate (TOPROL XL) 50 MG 24 hr " tablet Take 1 tablet (50 mg total) by mouth bedtime. 30 tablet 3     multivitamin with minerals (THERA-M) 9 mg iron-400 mcg Tab tablet Take 1 tablet by mouth every evening.       POLYETHYLENE GLYCOL 3350 (MIRALAX ORAL) Take by mouth.       travoprost (TRAVATAN Z) 0.004 % ophthalmic drops Administer 1 drop to both eyes at bedtime.       warfarin (COUMADIN) 4 MG tablet Take as directed  3     brimonidine (ALPHAGAN) 0.2 % ophthalmic solution Administer 1 drop to both eyes 2 (two) times a day.  0     dorzolamide-timolol (COSOPT) 22.3-6.8 mg/mL ophthalmic solution Administer 1 drop to both eyes 2 (two) times a day.       latanoprost (XALATAN) 0.005 % ophthalmic solution Administer 1 drop to both eyes bedtime.        pilocarpine (PILOCAR) 2 % ophthalmic solution Administer 1 drop into the left eye 2 (two) times a day.       warfarin (COUMADIN) 5 MG tablet Take 4 mg by mouth daily.        No current facility-administered medications for this visit.        Cardiographics:    Device interrogation: Permanent A. fib 98% biventricular paced     Echocardiogram: April 2015   Severe hypokinesis of the inferior segment(s) of the left ventricle. .    Overall left ventricular systolic function is moderately to severely    depressed.    Left ventricular ejection fraction is visually estimated to be 35 %.    Mild tricuspid regurgitation.    Doppler findings do not suggest pulmonary hypertension.      Coronary angiogram: 2013   Mild less than 40% nonobstructive coronary artery disease    Lab Results:       Lab Results   Component Value Date    CHOL 165 02/11/2013     Lab Results   Component Value Date    HDL 43 02/11/2013     Lab Results   Component Value Date    LDLCALC 102 02/11/2013     Lab Results   Component Value Date    TRIG 103 02/11/2013     BNP   Date Value Ref Range Status   01/21/2016 241 (H) 0 - 93 pg/mL Final       Ferdinand (Hernandez Casey MD

## 2021-06-22 NOTE — PROGRESS NOTES
Patient follows annually with Dr. Casey. He has a CRT-D, BIVP has been low for several years. With trigger pacing the LVP is 97%, but RVP is only 51%. This appears due to his Atrial fibrillation and competing ventricular sensing rate. Ventricular rates are between 70-140bpm, mostly controlled just competing or faster than his programmed paced rate of 70bpm. BIV-trigger pacing is not considered full BIVP.     Patient saw Dr. Casey in September and was stable and doing well. Last ECHO 5/12/15 EF 35%.     Routing to Dr. Casey so he is aware of low BIVP           Remote Report:  Type: routine remote CRT-D transmission.  Presenting rhythm: biV pacing and sensing, rate 70 bpm.  Battery/lead status: stable  Arrhythmias: since 9/6/18, three nonsustained VT detected.  Anticoagulant: Coumadin  Comments: normal ICD function. BiV pacing 51% RV, 97% LV. Will review with device RN.   Device/lead alerts: none. prd ADD: Noted, BIVP has been low for several years, but RVP in particular continues to drop. Note to Dr. Casey so he is aware. SAM

## 2021-06-22 NOTE — PROGRESS NOTES
Remote device check.  Please see link for full device report.  Patient was informed of results and next follow up via mail.    
no